# Patient Record
Sex: FEMALE | Race: WHITE | NOT HISPANIC OR LATINO | Employment: STUDENT | URBAN - METROPOLITAN AREA
[De-identification: names, ages, dates, MRNs, and addresses within clinical notes are randomized per-mention and may not be internally consistent; named-entity substitution may affect disease eponyms.]

---
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
## 2025-03-27 ENCOUNTER — PATIENT MESSAGE (OUTPATIENT)
Dept: PEDIATRIC GASTROENTEROLOGY | Facility: CLINIC | Age: 20
End: 2025-03-27
Payer: COMMERCIAL

## 2025-04-02 DIAGNOSIS — D68.00 VON WILLEBRAND DISEASE: ICD-10-CM

## 2025-04-02 RX ORDER — TRANEXAMIC ACID 650 MG/1
TABLET ORAL
Qty: 30 TABLET | Refills: 5 | Status: SHIPPED | OUTPATIENT
Start: 2025-04-02

## 2025-07-17 ENCOUNTER — PATIENT MESSAGE (OUTPATIENT)
Dept: INTERNAL MEDICINE | Facility: CLINIC | Age: 20
End: 2025-07-17
Payer: COMMERCIAL

## 2025-07-18 ENCOUNTER — PATIENT MESSAGE (OUTPATIENT)
Dept: INTERNAL MEDICINE | Facility: CLINIC | Age: 20
End: 2025-07-18
Payer: COMMERCIAL

## 2025-07-22 ENCOUNTER — NUTRITION (OUTPATIENT)
Dept: INTERNAL MEDICINE | Facility: CLINIC | Age: 20
End: 2025-07-22
Payer: COMMERCIAL

## 2025-07-22 DIAGNOSIS — K58.9 IRRITABLE BOWEL SYNDROME, UNSPECIFIED TYPE: ICD-10-CM

## 2025-07-22 PROCEDURE — 97802 MEDICAL NUTRITION INDIV IN: CPT | Mod: S$GLB,,, | Performed by: DIETITIAN, REGISTERED

## 2025-07-22 NOTE — PROGRESS NOTES
"Nutrition Assessment  Session Time:  45 minutes      Client name:  Bala Perkins  :  2005  Age:  19 y.o.  Gender:  female    Client states:  referred by Crystal Marquez MD with a diagnosis of:   -Irritable bowel syndrome, unspecified type     Seeking dietary advice.  Reports feeling bloated.  Last gastro appointment was in  when pediatric age. Have not seen an adult gastroenterologist.     Noticed worse bloating after fried food is consumed.  Good bit of improvement when not consuming gluten and with lactose free milk ([employer].  Lives with grandmother and sister. Everyone does not eat the same foods.         Anthropometrics  Height:  63"     Weight:  156.4 lbs  BMI:  27.8  % Body Fat:  n/a     RECENT WEIGHTS      Weight (lb) Weight (kg) BMI (Calculated)   2024 157.4  71.396  27.9    2025 156  70.761  27.6          Clinical Signs/Symptoms  N/V/D:  none noted  Appetite:  good       Past Medical History:   Diagnosis Date    Anxiety and depression     Norovirus 2022    Von Willebrand disease        No past surgical history on file.    Medications    has a current medication list which includes the following prescription(s): etonogestrel-ethinyl estradiol, fluoxetine, lactulose, linaclotide, omeprazole, sertraline, tranexamic acid, and tretinoin.    Vitamins, Minerals, and/or Supplements:  not discussed     Food/Medication Interactions:  Reviewed     Food Allergies or Intolerances:  NKFA noted in chart     Social History    Marital status:  Single  Employment:  yes    Social History     Tobacco Use    Smoking status: Never     Passive exposure: Never    Smokeless tobacco: Never   Substance Use Topics    Alcohol use: Never        Lab Reports   No results found for: "NA", "K", "CL", "CO2", "GLU", "BUN", "CREATININE", "CALCIUM", "PROT", "ALBUMIN", "BILITOT", "ALKPHOS", "AST", "ALT", "ANIONGAP", "ESTGFRAFRICA", "EGFRNONAA"   Lab Results   Component Value Date    WBC 6.92 2023    HGB " The patient is a 37y Female complaining of "11.3 (L) 03/07/2023    HCT 33.9 (L) 03/07/2023    MCV 84 03/07/2023     03/07/2023        No results found for: "CHOL"  No results found for: "HDL"  No results found for: "LDLCALC"  No results found for: "TRIG"  No results found for: "CHOLHDL"  No results found for: "LABA1C", "HGBA1C"  BP Readings from Last 1 Encounters:   07/17/25 121/79       Food History  reviewed    Exercise History:  reviewed    Cultural/Spiritual/Personal Preferences:  None identified    Support System:  family    State of Change:  Contemplation    Barriers to Change:  none    Diagnosis    Food and nutrition related knowledge deficit related to no prior nutrition counseling as evidenced by seeking dietary guidance for improving gastrointestinal issue.    Intervention    RMR (Method:  [place]):  1456 kcal  Activity Factor:  1.3    SHANON:  1892 kcal    Goals:  1.  Adequate daily fluids. Aim for 1/2 bodyweight (pounds) in ounces of caffeine free beverages per day.  2.  Participate in movement after eating. Consider walking 5+ minutes after every meal/snack  3.  Daily food and symptom diary  4. Low FODMAP diet    Nutrition Education  The following education was provided to the patient:  Suggested dietary modifications based on current dietary behaviors and individual food preferences.  Discussed Low FODMAP diet    Patient verbalized understanding of nutrition education and recommendations received.    Handouts Provided  Low FODMAP (handouts + links)    Monitoring/Evaluation    Monitor the following:  Weight  BMI  Caloric intake      Follow Up Plan:  as needed  "

## 2025-07-23 ENCOUNTER — PATIENT MESSAGE (OUTPATIENT)
Dept: INTERNAL MEDICINE | Facility: CLINIC | Age: 20
End: 2025-07-23
Payer: COMMERCIAL